# Patient Record
Sex: FEMALE
[De-identification: names, ages, dates, MRNs, and addresses within clinical notes are randomized per-mention and may not be internally consistent; named-entity substitution may affect disease eponyms.]

---

## 2023-06-30 ENCOUNTER — APPOINTMENT (OUTPATIENT)
Dept: PSYCHIATRY | Facility: CLINIC | Age: 28
End: 2023-06-30
Payer: COMMERCIAL

## 2023-06-30 PROCEDURE — 90791 PSYCH DIAGNOSTIC EVALUATION: CPT

## 2023-07-03 PROBLEM — Z00.00 ENCOUNTER FOR PREVENTIVE HEALTH EXAMINATION: Status: ACTIVE | Noted: 2023-07-03

## 2023-07-03 NOTE — RISK ASSESSMENT
[No] : No [No known suicide factors] : No known suicide factors [Depressed mood/Anhedonia] : depressed mood/anhedonia [Severe anxiety, agitation or panic] : severe anxiety, agitation or panic [Triggering events leading to humiliation, shame, and/or despair] : triggering events leading to humiliation, shame, and/or despair (e.g. loss of relationship, financial or health status) (real or anticipated) [Identifies reasons for living] : identifies reasons for living [Supportive social network of family or friends] : supportive social network of family or friends [Ability to cope with stress] : ability to cope with stress [Responsibility to children, family, or others] : responsibility to children, family, or others [Frustration tolerance] : frustration tolerance [Engaged in work or school] : engaged in work or school [None in the patient's lifetime] : None in the patient's lifetime [None Known] : none known [No known risk factors] : No known risk factors [No known protective factors] : No known protective factors

## 2023-07-03 NOTE — SOCIAL HISTORY
[FreeTextEntry1] : Patient reports not having a super active social life currently, but does see her friends 1-2 times a month. Hobbies are reading, hiking and traveling.

## 2023-07-03 NOTE — HISTORY OF PRESENT ILLNESS
[FreeTextEntry1] : Patient shared struggling with negative self-talk and internalizing past mistakes against self. pt shared negative thoughts and ruminations don't allow her to "be okay with setting boundaries". Patient also struggles with fear of failure and always trying to be a "perfectionist". Pt shared this may have been learned in childhood. patient reports this is reinforced at work due to; pressure to have good data, need to be detail oriented as well on the job. Patient shares struggling to ensure good quality work as those who report to her don't always do up to par with quality work resulting in patient reporting "I have to take on everything".  [FreeTextEntry2] : Patient denies any hx of SI or HI attempts in lifetime. No hx of psychiatric hospitalizations, no hx of legal or violence hx when assessed. No hx of substance abuse tx or abuse of same.

## 2023-07-03 NOTE — FAMILY HISTORY
[FreeTextEntry1] : Patient reports she has been  to her  Ethan for 2 years. Her  currently is working on his psychiatric residency and has two years before completion. Patient reports prior to getting  they were together for 10 years. Patient reports a good relationship with , at this time they are deciding where to raise children.\par patient reports she has two sisters one that is 8 years older than her that resides in [place] and another that is 4 years older than her that resides in [place]. She reports a cordial relationship with her sisters but it is distant due to the sisters living across the country.\par patient reports parents are both 69 and still . Patient reports mother is "fatalistic" at times emotionally manipulative, reports mother struggles with auto-immune disorder and had past hx of thyroid cancer and endometriosis. Patient reports a doesn't always get along well with mother, but the relationship has improved since the past. \par Patient reports father is opposite of mother, and is a calming force in the family. reports father does struggle with heart issues and anxiety, she believes his mediating role contributes to father's health and mental health issues. \par Patient denies any family hx of substance abuse or domestic violence in the home. Denies hx of CPS involvement or DV hx. Mental health is NAMAN with father.
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
## 2023-07-03 NOTE — REASON FOR VISIT
[Medical Office: ([facility])___] : The provider was located at the medical office in [unfilled]. [OK  to leave message] : OK  to leave message [Self-Referred] : Self-Referred [Not Applicable] : Not Applicable [Patient] : Patient [FreeTextEntry1] : Patient seeking therapy to work on setting boundaries and better manage stressors.

## 2023-07-07 ENCOUNTER — APPOINTMENT (OUTPATIENT)
Dept: PSYCHIATRY | Facility: CLINIC | Age: 28
End: 2023-07-07
Payer: COMMERCIAL

## 2023-07-07 ENCOUNTER — APPOINTMENT (OUTPATIENT)
Dept: PSYCHIATRY | Facility: CLINIC | Age: 28
End: 2023-07-07

## 2023-07-07 PROCEDURE — 90837 PSYTX W PT 60 MINUTES: CPT | Mod: 95

## 2023-07-10 ENCOUNTER — APPOINTMENT (OUTPATIENT)
Dept: PSYCHIATRY | Facility: CLINIC | Age: 28
End: 2023-07-10
Payer: COMMERCIAL

## 2023-07-10 PROCEDURE — 90837 PSYTX W PT 60 MINUTES: CPT | Mod: 95

## 2023-07-11 NOTE — PLAN
[FreeTextEntry2] : Goal: Client will reduce overall level, frequency and intensity of anxiety so that daily functioning is not impaired.\par Objective: Client will develop appropriate relaxation and diversion activities to decrease level of anxiety.\par Intervention: Clinician will assist pt to practice using healthy communication techniques in order to communicate his feelings and needs in order to help avoid isolating when experiencing depression or anxiety\par Intervention: Clinician will teach pt various methods of stress reduction (medications, deep breathing, etc.) and assist in implementing these into daily life\par Intervention: Clinician will utilize logic and reality based thinking to challenge each unhealthy/negative thought for accuracy, replacing it with a positive, accurate thought [Acceptance and Commitment Therapy] : Acceptance and Commitment Therapy  [Cognitive and/or Behavior Therapy] : Cognitive and/or Behavior Therapy  [Psychodynamic Therapy] : Psychodynamic Therapy  [Psychoeducation] : Psychoeducation  [Skills training (all types)] : Skills training (all types)  [Supportive Therapy] : Supportive Therapy [de-identified] : Pt presented as oriented times three and with stable mood. No SI or HI when assessed.\par Pt and clinician utilized ACT concepts to help re-frame negative narrative for self and incorporate positive self-talk. Further work in this area to continue from strengths perspective. \par Session was virtual.  [FreeTextEntry1] : Client to attend weekly sessions to reach desired potential and full adaptive functioning.

## 2023-07-11 NOTE — END OF VISIT
[Teletherapy Service Provided] : The services provided in this session were delivered via tele-therapy

## 2023-07-11 NOTE — PLAN
[FreeTextEntry2] : Goal: Client will reduce overall level, frequency and intensity of anxiety so that daily functioning is not impaired.\par Objective: Client will develop appropriate relaxation and diversion activities to decrease level of anxiety.\par Intervention: Clinician will assist pt to practice using healthy communication techniques in order to communicate his feelings and needs in order to help avoid isolating when experiencing depression or anxiety\par Intervention: Clinician will teach pt various methods of stress reduction (medications, deep breathing, etc.) and assist in implementing these into daily life\par Intervention: Clinician will utilize logic and reality based thinking to challenge each unhealthy/negative thought for accuracy, replacing it with a positive, accurate thought [Acceptance and Commitment Therapy] : Acceptance and Commitment Therapy  [Cognitive and/or Behavior Therapy] : Cognitive and/or Behavior Therapy  [Psychodynamic Therapy] : Psychodynamic Therapy  [Psychoeducation] : Psychoeducation  [Skills training (all types)] : Skills training (all types)  [Supportive Therapy] : Supportive Therapy [de-identified] : Pt presented as oriented times three and with stable mood. No SI or HI when assessed.\par Patient completed personal philosophy for self and finding best ways to move forward. PT gained awareness of shoulds vs needs and wants, and not always putting self first. Plan created with healthy space to begin practice of same via affirmation with psychodynamic exercise. Pt responded well to same.\par Strengths perspective to be utilized next session.\par Session was virtual.  [FreeTextEntry1] : Client to attend weekly sessions to reach desired potential and full adaptive functioning.

## 2023-07-17 ENCOUNTER — APPOINTMENT (OUTPATIENT)
Dept: PSYCHIATRY | Facility: CLINIC | Age: 28
End: 2023-07-17
Payer: COMMERCIAL

## 2023-07-17 PROCEDURE — 90837 PSYTX W PT 60 MINUTES: CPT | Mod: 95

## 2023-07-18 NOTE — END OF VISIT
[Individual Psychotherapy for 53+ minutes] : Individual Psychotherapy for 53+ minutes  [Teletherapy Service Provided] : The services provided in this session were delivered via tele-therapy

## 2023-07-18 NOTE — PLAN
[FreeTextEntry2] : Goal: Client will reduce overall level, frequency and intensity of anxiety so that daily functioning is not impaired.\par Objective: Client will develop appropriate relaxation and diversion activities to decrease level of anxiety.\par Intervention: Clinician will assist pt to practice using healthy communication techniques in order to communicate his feelings and needs in order to help avoid isolating when experiencing depression or anxiety\par Intervention: Clinician will teach pt various methods of stress reduction (medications, deep breathing, etc.) and assist in implementing these into daily life\par Intervention: Clinician will utilize logic and reality based thinking to challenge each unhealthy/negative thought for accuracy, replacing it with a positive, accurate thought [Acceptance and Commitment Therapy] : Acceptance and Commitment Therapy  [Cognitive and/or Behavior Therapy] : Cognitive and/or Behavior Therapy  [Psychodynamic Therapy] : Psychodynamic Therapy  [Psychoeducation] : Psychoeducation  [Skills training (all types)] : Skills training (all types)  [Supportive Therapy] : Supportive Therapy [de-identified] : Pt presented as oriented times three and with stable mood. No SI or HI when assessed.\par Empty chair technique utilized today to have patient speak with younger self, the part of self that fears failure. Client was able to comfort self and gain awareness in the here and now for a healthier perspective with her work-life balance and healthier perspective for self against negative self talk. Client was able to integrate positive reality based messaging for self from psychodynamic exercise. \par Session was virtual.  [FreeTextEntry1] : Client to attend weekly sessions to reach desired potential and full adaptive functioning.

## 2023-07-24 ENCOUNTER — APPOINTMENT (OUTPATIENT)
Dept: PSYCHIATRY | Facility: CLINIC | Age: 28
End: 2023-07-24
Payer: COMMERCIAL

## 2023-07-24 PROCEDURE — 90837 PSYTX W PT 60 MINUTES: CPT | Mod: 95

## 2023-07-25 NOTE — PLAN
[FreeTextEntry2] : Goal: Client will reduce overall level, frequency and intensity of anxiety so that daily functioning is not impaired.\par Objective: Client will develop appropriate relaxation and diversion activities to decrease level of anxiety.\par Intervention: Clinician will assist pt to practice using healthy communication techniques in order to communicate his feelings and needs in order to help avoid isolating when experiencing depression or anxiety\par Intervention: Clinician will teach pt various methods of stress reduction (medications, deep breathing, etc.) and assist in implementing these into daily life\par Intervention: Clinician will utilize logic and reality based thinking to challenge each unhealthy/negative thought for accuracy, replacing it with a positive, accurate thought [Acceptance and Commitment Therapy] : Acceptance and Commitment Therapy  [Cognitive and/or Behavior Therapy] : Cognitive and/or Behavior Therapy  [Psychodynamic Therapy] : Psychodynamic Therapy  [Psychoeducation] : Psychoeducation  [Skills training (all types)] : Skills training (all types)  [Supportive Therapy] : Supportive Therapy [de-identified] : Pt presented as oriented times three and with stable mood. No SI or HI when assessed.\par Mindfulness coping skills introduced in today's sessions to better manage self and promote healthy regulation. Client found exercise helpful and resources provided. Session focus was on awareness and ways to expand social circles and horizons by working and resourcing in client's comfort zones and strengths. Client was able to re-frame social ventures slightly and further work to continue in this area. \par Session was virtual.  [FreeTextEntry1] : Client to attend weekly sessions to reach desired potential and full adaptive functioning.

## 2023-08-21 ENCOUNTER — APPOINTMENT (OUTPATIENT)
Dept: PSYCHIATRY | Facility: CLINIC | Age: 28
End: 2023-08-21
Payer: COMMERCIAL

## 2023-08-21 PROCEDURE — 90837 PSYTX W PT 60 MINUTES: CPT | Mod: 95

## 2023-08-21 NOTE — PLAN
[FreeTextEntry2] : Goal: Client will reduce overall level, frequency and intensity of anxiety so that daily functioning is not impaired.\par  Objective: Client will develop appropriate relaxation and diversion activities to decrease level of anxiety.\par  Intervention: Clinician will assist pt to practice using healthy communication techniques in order to communicate his feelings and needs in order to help avoid isolating when experiencing depression or anxiety\par  Intervention: Clinician will teach pt various methods of stress reduction (medications, deep breathing, etc.) and assist in implementing these into daily life\par  Intervention: Clinician will utilize logic and reality based thinking to challenge each unhealthy/negative thought for accuracy, replacing it with a positive, accurate thought [Acceptance and Commitment Therapy] : Acceptance and Commitment Therapy  [Cognitive and/or Behavior Therapy] : Cognitive and/or Behavior Therapy  [Psychodynamic Therapy] : Psychodynamic Therapy  [Psychoeducation] : Psychoeducation  [Skills training (all types)] : Skills training (all types)  [Supportive Therapy] : Supportive Therapy [FreeTextEntry1] : Client to attend weekly sessions to reach desired potential and full adaptive functioning.  [de-identified] : Pt presented as oriented times three and with stable mood. No SI or HI when assessed. Client returned from [place] with new/re-orienting mindset for self. Session focus was targeting on development of NC "I am a burden" and re-framing same via mindfulness and CBT. Client was able to utilize awareness of this and bring about healthier perspective. Psychoeducation provided to introduce patient to EMDR therapy and what it entails. MARGARITA and Four elements exercise to be introduce next session.  Session was virtual.

## 2023-08-30 ENCOUNTER — APPOINTMENT (OUTPATIENT)
Dept: PSYCHIATRY | Facility: CLINIC | Age: 28
End: 2023-08-30
Payer: COMMERCIAL

## 2023-08-30 PROCEDURE — 90834 PSYTX W PT 45 MINUTES: CPT | Mod: 95

## 2023-08-30 NOTE — PLAN
[FreeTextEntry2] : Goal: Client will reduce overall level, frequency and intensity of anxiety so that daily functioning is not impaired.\par  Objective: Client will develop appropriate relaxation and diversion activities to decrease level of anxiety.\par  Intervention: Clinician will assist pt to practice using healthy communication techniques in order to communicate his feelings and needs in order to help avoid isolating when experiencing depression or anxiety\par  Intervention: Clinician will teach pt various methods of stress reduction (medications, deep breathing, etc.) and assist in implementing these into daily life\par  Intervention: Clinician will utilize logic and reality based thinking to challenge each unhealthy/negative thought for accuracy, replacing it with a positive, accurate thought [Acceptance and Commitment Therapy] : Acceptance and Commitment Therapy  [Cognitive and/or Behavior Therapy] : Cognitive and/or Behavior Therapy  [Psychodynamic Therapy] : Psychodynamic Therapy  [Psychoeducation] : Psychoeducation  [Skills training (all types)] : Skills training (all types)  [Supportive Therapy] : Supportive Therapy [de-identified] : Pt presented as oriented times three and with stable mood. No SI or HI when assessed. Client shared feeling disappointed about self due to feeling sad and not meeting expecatations. CBT utilized to cognitive reframe with ACT components. Client was able to integrate healthier positive reality based messaging and self talk vs negative. This led to discussion to begin EMDR therapy and other ways to re-orient self to remember the positive qualities she brings to relationships and also that the relationships she worries about, those folks are more than content with her. Further work to continue in this area next session.  Session was virtual.  [FreeTextEntry1] : Client to attend weekly sessions to reach desired potential and full adaptive functioning.

## 2023-09-08 ENCOUNTER — APPOINTMENT (OUTPATIENT)
Dept: PSYCHIATRY | Facility: CLINIC | Age: 28
End: 2023-09-08
Payer: COMMERCIAL

## 2023-09-08 PROCEDURE — 90834 PSYTX W PT 45 MINUTES: CPT | Mod: 95

## 2023-09-08 NOTE — PLAN
[FreeTextEntry2] : Goal: Client will reduce overall level, frequency and intensity of anxiety so that daily functioning is not impaired.\par  Objective: Client will develop appropriate relaxation and diversion activities to decrease level of anxiety.\par  Intervention: Clinician will assist pt to practice using healthy communication techniques in order to communicate his feelings and needs in order to help avoid isolating when experiencing depression or anxiety\par  Intervention: Clinician will teach pt various methods of stress reduction (medications, deep breathing, etc.) and assist in implementing these into daily life\par  Intervention: Clinician will utilize logic and reality based thinking to challenge each unhealthy/negative thought for accuracy, replacing it with a positive, accurate thought [Acceptance and Commitment Therapy] : Acceptance and Commitment Therapy  [Cognitive and/or Behavior Therapy] : Cognitive and/or Behavior Therapy  [Psychodynamic Therapy] : Psychodynamic Therapy  [Psychoeducation] : Psychoeducation  [Skills training (all types)] : Skills training (all types)  [Supportive Therapy] : Supportive Therapy [de-identified] : Pt presented as oriented times three and with stable mood. No SI or HI when assessed. EMDR tx plan created. NC was "I am not good enough" vs PC " I am good enough". 1st Memory: (earliest): ballet age 8 years old. Difficult teacher please.    2nd: Memory: soccer age 12 years old. Soccer no longer became fun. I felt out like fish out of water.  3rd Memory: Memory w/mom (gossip) yelling conflict and felt bad. (age 12-14) Four elements exercise/safe place done. 4th Memory: Validating negative thoughts with mom and body image/facebook  (age 18) Session was virtual.  [FreeTextEntry1] : Client to attend weekly sessions to reach desired potential and full adaptive functioning.

## 2023-09-15 ENCOUNTER — APPOINTMENT (OUTPATIENT)
Dept: PSYCHIATRY | Facility: CLINIC | Age: 28
End: 2023-09-15
Payer: COMMERCIAL

## 2023-09-15 PROCEDURE — 90834 PSYTX W PT 45 MINUTES: CPT | Mod: 95

## 2023-09-22 ENCOUNTER — APPOINTMENT (OUTPATIENT)
Dept: PSYCHIATRY | Facility: CLINIC | Age: 28
End: 2023-09-22
Payer: COMMERCIAL

## 2023-09-22 PROCEDURE — 90837 PSYTX W PT 60 MINUTES: CPT | Mod: GT

## 2023-09-27 ENCOUNTER — APPOINTMENT (OUTPATIENT)
Dept: PSYCHIATRY | Facility: CLINIC | Age: 28
End: 2023-09-27
Payer: COMMERCIAL

## 2023-09-27 PROCEDURE — 90837 PSYTX W PT 60 MINUTES: CPT | Mod: GT

## 2023-10-06 ENCOUNTER — APPOINTMENT (OUTPATIENT)
Dept: PSYCHIATRY | Facility: CLINIC | Age: 28
End: 2023-10-06
Payer: COMMERCIAL

## 2023-10-06 PROCEDURE — 90837 PSYTX W PT 60 MINUTES: CPT | Mod: GT

## 2023-10-13 ENCOUNTER — APPOINTMENT (OUTPATIENT)
Dept: PSYCHIATRY | Facility: CLINIC | Age: 28
End: 2023-10-13
Payer: COMMERCIAL

## 2023-10-13 PROCEDURE — 90837 PSYTX W PT 60 MINUTES: CPT | Mod: GT

## 2023-10-18 ENCOUNTER — TRANSCRIPTION ENCOUNTER (OUTPATIENT)
Age: 28
End: 2023-10-18

## 2023-10-23 ENCOUNTER — APPOINTMENT (OUTPATIENT)
Dept: PSYCHIATRY | Facility: CLINIC | Age: 28
End: 2023-10-23
Payer: COMMERCIAL

## 2023-10-23 PROCEDURE — 90834 PSYTX W PT 45 MINUTES: CPT | Mod: GT

## 2023-11-02 ENCOUNTER — APPOINTMENT (OUTPATIENT)
Dept: PSYCHIATRY | Facility: CLINIC | Age: 28
End: 2023-11-02
Payer: COMMERCIAL

## 2023-11-02 PROCEDURE — 90834 PSYTX W PT 45 MINUTES: CPT | Mod: GT

## 2023-11-14 ENCOUNTER — APPOINTMENT (OUTPATIENT)
Dept: PSYCHIATRY | Facility: CLINIC | Age: 28
End: 2023-11-14
Payer: COMMERCIAL

## 2023-11-14 PROCEDURE — 90837 PSYTX W PT 60 MINUTES: CPT | Mod: GT

## 2023-11-28 ENCOUNTER — APPOINTMENT (OUTPATIENT)
Dept: PSYCHIATRY | Facility: CLINIC | Age: 28
End: 2023-11-28
Payer: COMMERCIAL

## 2023-11-28 PROCEDURE — 90837 PSYTX W PT 60 MINUTES: CPT | Mod: GT

## 2023-12-05 ENCOUNTER — APPOINTMENT (OUTPATIENT)
Dept: PSYCHIATRY | Facility: CLINIC | Age: 28
End: 2023-12-05